# Patient Record
Sex: FEMALE | Race: WHITE | NOT HISPANIC OR LATINO | URBAN - METROPOLITAN AREA
[De-identification: names, ages, dates, MRNs, and addresses within clinical notes are randomized per-mention and may not be internally consistent; named-entity substitution may affect disease eponyms.]

---

## 2021-05-20 ENCOUNTER — RECORDS - HEALTHEAST (OUTPATIENT)
Dept: RADIOLOGY | Facility: CLINIC | Age: 21
End: 2021-05-20

## 2021-05-21 ENCOUNTER — OFFICE VISIT - HEALTHEAST (OUTPATIENT)
Dept: UROLOGY | Facility: CLINIC | Age: 21
End: 2021-05-21

## 2021-05-21 DIAGNOSIS — N20.1 CALCULUS OF URETER: ICD-10-CM

## 2021-05-21 DIAGNOSIS — R10.11 RIGHT UPPER QUADRANT ABDOMINAL PAIN: ICD-10-CM

## 2021-05-21 DIAGNOSIS — N20.0 CALCULUS OF KIDNEY: ICD-10-CM

## 2021-06-17 NOTE — PROGRESS NOTES
Assessment/Plan:    Assessment & Plan   Berta was seen today for new problem - ed referred.    Diagnoses and all orders for this visit:    Right upper quadrant abdominal pain    Calculus of ureter    Calculus of kidney      Stone Management Plan  [facility] 5/21/2021   Urinary Tract Infection No suspicion of infection   Renal Colic Asymptomatic at this time   Renal Failure No suspicion of renal failure   Current CT date 5/17/2021   Right sided stones? Yes   R Number of ureteral stones No ureteral stones   R Number of kidney stones  5   R GSD of kidney stones < 2   R Hydronephrosis None   R Stone Event No current event   R Current Plan Observe   Observe rationale Limited stone burden with good prognosis for spontaneous passage   Left sided stones? Yes   L Number of ureteral stones 1   L GSD of ureteral stones 1   L Location of ureteral stone Distal   L Number of kidney stones  No renal stones   L GSD of kidney stones N/A   L Hydronephrosis None   L Stone Event New stone passed prior to visit   L MET Status Passed           PLAN    21 yo F with history of kidney stones and chronic right abdominal pain, previous surgical stone intervention. Previous tiny left UVJ stone, presumably passed. Nonobstructing, tiny right renal stones.    She is reassured that her current stone burden is not the cause of her chronic right abdominal pain. Would not recommend any surgical intervention for the stable, non-obstructing right renal stones. She should follow up with her PCP and Dr. Holloway, as she is also an excellent candidate to pursue metabolic stone risk evaluation. She would prefer to follow up as directed and will return to our office on a prn basis.    Phone call duration: 25 minutes  32 minutes spent on the date of the encounter doing chart review, history and exam, documentation and further activities per the note    Julissa Delacruz PA-C  [facility]    Subjective:       HPI  Ms. Berta Plata is a 20 y.o.  female who is being evaluated via a billable telephone visit by [facility] [place] following bounce back ER visits for diffuse flank and back pain.    She is an unidentified composition stone former who has required stone clearance procedures. She has not previously participated in stone risk evaluation. She has no identified modifiable stone risk factors. She has no identified non-modifiable stone risk factors.    She was initially seen through ER 5/16/21 for diffuse abdominal pain x 1 day and vaginal symptoms. Workup included normal ultrasound and negative STD testing. She returned to ER the following day for recurrent pain, but focalized to the left flank. She reported ongoing vaginal burning and dysuria. Pain was 8/10 while in ER. She reported minimal relief with Tylenol. Workup was notable for CT reporting a 1 mm stone nearly within bladder vs left UVJ. She was sent home without additional prescriptions.     Of note, she has followed with Dr. Holloway, underwent a right URS with laser in 2018 for 5 mm renal stone. Post op renal ultrasound was negative. She was seen by Dr. Holloway Sept 2019 for right flank pain x 2 months. An abdominal US was obtained due to insurance constraints, which was negative for hydronephrosis. She was directed to her PCP as pain was not attributable to  system. She had another CT scan April 2020 through ER visit for RLQ pain which reported 2 tiny right renal stones and no acute findings for pain. She has required multiple ER visits and imaging for ongoing right abdominal pain. When questioned, she reports the pain pre-dates the stone clearance in 2018.    Significant current symptoms include:  mild right upper abdominal pain, pointing to below her right lower rib margin. Pertinent negative current symptoms include:  fever, chills, right flank pain, left flank pain, vomiting, hematuria, urinary frequency and  dysuria.     CT scans from 5/17/21 and 4/18/20 are personally reviewed and demonstrate a < 2 mm left UVJ stone with minimal hydronephrosis. No change to right renal stones x 5, all < 2 mm.    Significant labs from presentation include no hematuria, no pyuria, negative nitrite, few bacteria, normal WBC, normal creatinine and normal potassium.     ROS   A 12 point comprehensive review of systems is negative except for HPI    Past Medical History:   Diagnosis Date     Kidney stone        Past Surgical History:   Procedure Laterality Date     URETEROSCOPY  2020       No current outpatient medications on file.     No current facility-administered medications for this visit.      Allergies   Allergen Reactions     Acetaminophen-Codeine Angioedema and Rash       Social History     Socioeconomic History     Marital status: Single     Spouse name: Not on file     Number of children: Not on file     Years of education: Not on file     Highest education level: Not on file   Occupational History     Not on file   Social Needs     Financial resource strain: Not on file     Food insecurity     Worry: Not on file     Inability: Not on file     Transportation needs     Medical: Not on file     Non-medical: Not on file   Tobacco Use     Smoking status: Never Smoker   Substance and Sexual Activity     Alcohol use: Never     Frequency: Never     Drug use: Never     Sexual activity: Not on file   Lifestyle     Physical activity     Days per week: Not on file     Minutes per session: Not on file     Stress: Not on file   Relationships     Social connections     Talks on phone: Not on file     Gets together: Not on file     Attends Scientologist service: Not on file     Active member of club or organization: Not on file     Attends meetings of clubs or organizations: Not on file     Relationship status: Not on file     Intimate partner violence     Fear of current or ex partner: Not on file     Emotionally abused: Not on file     Physically  abused: Not on file     Forced sexual activity: Not on file   Other Topics Concern     Not on file   Social History Narrative     Not on file       Family History   Problem Relation Age of Onset     No Medical Problems Mother      No Medical Problems Father      No Medical Problems Sister      No Medical Problems Brother      No Medical Problems Maternal Aunt      No Medical Problems Maternal Uncle      No Medical Problems Paternal Aunt      No Medical Problems Paternal Uncle      No Medical Problems Maternal Grandmother      No Medical Problems Maternal Grandfather      No Medical Problems Paternal Grandmother      No Medical Problems Paternal Grandfather        Objective:      No vitals or physical exam obtained due to virtual visit    Labs    Urinalysis POC (Office):  No results found for: BLOODUA, NITRITE, LEUKOCYTESUA, PHUA    Lab Urinalysis:  No results found for: HGBUA, NITRITE, LEUKOCYTESUR, PHUR and Acute Labs CBC No results found for: WBC, HGB, PLT, Renal Panel  KSINo results found for: CREATININE, CRCLEARANCE, K, CALCIUM and Urine Culture  No results found for: CULTURE

## 2021-06-17 NOTE — PATIENT INSTRUCTIONS - HE
Calcium Oxalate Stone Prevention Self Management    Drink more fluids:    Drinking more liquids is the best way you can help prevent future stones. Stones can form when substances in the urine are too concentrated. The more you drink, the more urine you will make. This means all substances in the urine will be less concentrated.    How much urine should I be producing?    The usual recommended daily urine production is about 2 to 3 quarts (2314-0834 ml). If you are producing more than 3 quarts of urine on a regular basis, it is possible to deplete important minerals stored in the body.    To measure the amount of urine you produce in a day, you can either:    Collect all urine in a container and measure at the end of the day     Use a measuring cup each time you urinate and add up the amounts at the end of the day     Observe    Color - Dark yasmine urine is concentrated. Light straw color or lighter is dilute and desirable     Odor - Concentrated urine tends to smell stronger. Dilute urine is nearly odorless    Ways to increase your fluid intake    Increasing the amount of fluid you drink is effective for all types of kidney stones. While water is commonly recommended, all fluids are effective for increasing the amount of urine your body produces.    Focus on starting a lifelong habit, rather than a short-term solution.     Keep liquids on hand that you like. Crystal Light is a low calorie appropriate choice.    Drink out of larger glasses. You'll tend to drink more with each serving.     Have an additional glass of fluid with each meal.     Keep a water or drink bottle at work and fill it regularly.     *If you are prone to fluid retention, consult your doctor before making changes to your fluid habits.    Low Oxalate Diet:    Avoid excess amounts or daily consumption of these foods:    All nuts and nut products including peanuts, almonds, pecans, peanut butter, almond milk    Rhubarb    Chocolate    Soybeans and  soy products     Spinach    Wheat Germ    Beets    Maintain a normal calcium diet:    Researches have found that people with low calcium intakes tend to have more stones. Foods with high calcium content are acceptable and include:    Dairy products (including milk, cheese and yogurt)    Meat and fish    Enriched cereals    Dark green vegetables    What about calcium supplements?     Many people take calcium supplements, either on their own or as prescribed by a doctor. Research has indicated that calcium supplements do not usually pose a risk for stone formation.  Calcium citrate is a better choice for a supplement.    Avoid excess salt:    Salt (sodium chloride) is found in abundance in many foods. High sodium levels in the urine can interfere with the kidney's handling of calcium.     Tips for reducing the salt in your diet:    Don't use salt at the table    Reduce the salt used in food preparation. Try 1/2 teaspoon when recipes call for 1 teaspoon.    Use herbs and spices for flavoring instead of salt.    Avoid salty foods.    Check the label before you buy or use a product. Note sodium and portion size information.    Try to consume less than 2,000 mg/day. (1 teaspoon = 2,000 mg)    Foods with high sodium content include:    Processed meat (including luncheon meats, sausage)     Crackers     Instant cereal     Processed cheese     Canned soups     Chips and snack foods     Soy sauce    [facility] [place] can respond to your questions or concerns 24 hours a day at [phone].

## 2021-06-17 NOTE — PROGRESS NOTES
Patient roomed via telephone for a virtual visit.  She is being seen for a stone consultation.  Patient confirmed she is in the [facility] at the time of this appointment.

## 2024-10-26 ENCOUNTER — LAB REQUISITION (OUTPATIENT)
Dept: LAB | Facility: CLINIC | Age: 24
End: 2024-10-26

## 2024-10-26 PROCEDURE — 87491 CHLMYD TRACH DNA AMP PROBE: CPT | Performed by: EMERGENCY MEDICINE

## 2024-10-29 LAB
C TRACH DNA SPEC QL PROBE+SIG AMP: NEGATIVE
N GONORRHOEA DNA SPEC QL NAA+PROBE: NEGATIVE